# Patient Record
Sex: MALE | Race: WHITE
[De-identification: names, ages, dates, MRNs, and addresses within clinical notes are randomized per-mention and may not be internally consistent; named-entity substitution may affect disease eponyms.]

---

## 2021-03-27 ENCOUNTER — HOSPITAL ENCOUNTER (EMERGENCY)
Dept: HOSPITAL 8 - ED | Age: 51
Discharge: HOME | End: 2021-03-27
Payer: OTHER GOVERNMENT

## 2021-03-27 VITALS — DIASTOLIC BLOOD PRESSURE: 85 MMHG | SYSTOLIC BLOOD PRESSURE: 135 MMHG

## 2021-03-27 VITALS — WEIGHT: 236.78 LBS | BODY MASS INDEX: 30.39 KG/M2 | HEIGHT: 74 IN
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
## 2021-03-27 DIAGNOSIS — S52.021A: Primary | ICD-10-CM

## 2021-03-27 DIAGNOSIS — S62.354A: ICD-10-CM
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
## 2021-03-27 PROCEDURE — 99283 EMERGENCY DEPT VISIT LOW MDM: CPT
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
## 2021-03-30 ENCOUNTER — HOSPITAL ENCOUNTER (OUTPATIENT)
Facility: MEDICAL CENTER | Age: 51
End: 2021-03-30
Attending: ANESTHESIOLOGY
Payer: COMMERCIAL

## 2021-03-30 LAB
SARS-COV+SARS-COV-2 AG RESP QL IA.RAPID: NOTDETECTED
SPECIMEN SOURCE: NORMAL

## 2021-03-30 PROCEDURE — 87426 SARSCOV CORONAVIRUS AG IA: CPT

## 2023-06-30 ENCOUNTER — HOSPITAL ENCOUNTER (EMERGENCY)
Facility: MEDICAL CENTER | Age: 53
End: 2023-06-30
Attending: EMERGENCY MEDICINE
Payer: COMMERCIAL

## 2023-06-30 ENCOUNTER — APPOINTMENT (OUTPATIENT)
Dept: RADIOLOGY | Facility: MEDICAL CENTER | Age: 53
End: 2023-06-30
Attending: EMERGENCY MEDICINE
Payer: COMMERCIAL

## 2023-06-30 VITALS
SYSTOLIC BLOOD PRESSURE: 142 MMHG | OXYGEN SATURATION: 94 % | TEMPERATURE: 97.9 F | RESPIRATION RATE: 16 BRPM | BODY MASS INDEX: 28.1 KG/M2 | WEIGHT: 218.92 LBS | DIASTOLIC BLOOD PRESSURE: 88 MMHG | HEIGHT: 74 IN | HEART RATE: 70 BPM

## 2023-06-30 DIAGNOSIS — K14.0 CELLULITIS OF TONGUE: ICD-10-CM

## 2023-06-30 DIAGNOSIS — E87.1 HYPONATREMIA: ICD-10-CM

## 2023-06-30 DIAGNOSIS — S01.552A OPEN WOUND OF TONGUE DUE TO BITE: ICD-10-CM

## 2023-06-30 LAB
ALBUMIN SERPL BCP-MCNC: 4.4 G/DL (ref 3.2–4.9)
ALBUMIN/GLOB SERPL: 1.9 G/DL
ALP SERPL-CCNC: 37 U/L (ref 30–99)
ALT SERPL-CCNC: 16 U/L (ref 2–50)
ANION GAP SERPL CALC-SCNC: 11 MMOL/L (ref 7–16)
AST SERPL-CCNC: 15 U/L (ref 12–45)
BASOPHILS # BLD AUTO: 0.3 % (ref 0–1.8)
BASOPHILS # BLD: 0.04 K/UL (ref 0–0.12)
BILIRUB SERPL-MCNC: 0.4 MG/DL (ref 0.1–1.5)
BUN SERPL-MCNC: 6 MG/DL (ref 8–22)
CALCIUM ALBUM COR SERPL-MCNC: 8.6 MG/DL (ref 8.5–10.5)
CALCIUM SERPL-MCNC: 8.9 MG/DL (ref 8.5–10.5)
CHLORIDE SERPL-SCNC: 87 MMOL/L (ref 96–112)
CO2 SERPL-SCNC: 24 MMOL/L (ref 20–33)
CREAT SERPL-MCNC: 0.89 MG/DL (ref 0.5–1.4)
EOSINOPHIL # BLD AUTO: 0.05 K/UL (ref 0–0.51)
EOSINOPHIL NFR BLD: 0.4 % (ref 0–6.9)
ERYTHROCYTE [DISTWIDTH] IN BLOOD BY AUTOMATED COUNT: 39.2 FL (ref 35.9–50)
GFR SERPLBLD CREATININE-BSD FMLA CKD-EPI: 102 ML/MIN/1.73 M 2
GLOBULIN SER CALC-MCNC: 2.3 G/DL (ref 1.9–3.5)
GLUCOSE SERPL-MCNC: 88 MG/DL (ref 65–99)
HCT VFR BLD AUTO: 39.5 % (ref 42–52)
HGB BLD-MCNC: 14.1 G/DL (ref 14–18)
IMM GRANULOCYTES # BLD AUTO: 0.05 K/UL (ref 0–0.11)
IMM GRANULOCYTES NFR BLD AUTO: 0.4 % (ref 0–0.9)
LYMPHOCYTES # BLD AUTO: 3.58 K/UL (ref 1–4.8)
LYMPHOCYTES NFR BLD: 30.5 % (ref 22–41)
MCH RBC QN AUTO: 30.8 PG (ref 27–33)
MCHC RBC AUTO-ENTMCNC: 35.7 G/DL (ref 32.3–36.5)
MCV RBC AUTO: 86.2 FL (ref 81.4–97.8)
MONOCYTES # BLD AUTO: 0.95 K/UL (ref 0–0.85)
MONOCYTES NFR BLD AUTO: 8.1 % (ref 0–13.4)
NEUTROPHILS # BLD AUTO: 7.06 K/UL (ref 1.82–7.42)
NEUTROPHILS NFR BLD: 60.3 % (ref 44–72)
NRBC # BLD AUTO: 0 K/UL
NRBC BLD-RTO: 0 /100 WBC (ref 0–0.2)
PLATELET # BLD AUTO: 266 K/UL (ref 164–446)
PMV BLD AUTO: 8.7 FL (ref 9–12.9)
POTASSIUM SERPL-SCNC: 4.1 MMOL/L (ref 3.6–5.5)
PROT SERPL-MCNC: 6.7 G/DL (ref 6–8.2)
RBC # BLD AUTO: 4.58 M/UL (ref 4.7–6.1)
SODIUM SERPL-SCNC: 122 MMOL/L (ref 135–145)
VALPROATE SERPL-MCNC: 52.5 UG/ML (ref 50–100)
WBC # BLD AUTO: 11.7 K/UL (ref 4.8–10.8)

## 2023-06-30 PROCEDURE — 80164 ASSAY DIPROPYLACETIC ACD TOT: CPT

## 2023-06-30 PROCEDURE — 70491 CT SOFT TISSUE NECK W/DYE: CPT

## 2023-06-30 PROCEDURE — 96365 THER/PROPH/DIAG IV INF INIT: CPT | Mod: XU

## 2023-06-30 PROCEDURE — 99284 EMERGENCY DEPT VISIT MOD MDM: CPT

## 2023-06-30 PROCEDURE — 96375 TX/PRO/DX INJ NEW DRUG ADDON: CPT | Mod: XU

## 2023-06-30 PROCEDURE — 36415 COLL VENOUS BLD VENIPUNCTURE: CPT

## 2023-06-30 PROCEDURE — 85025 COMPLETE CBC W/AUTO DIFF WBC: CPT

## 2023-06-30 PROCEDURE — 80053 COMPREHEN METABOLIC PANEL: CPT

## 2023-06-30 PROCEDURE — 700105 HCHG RX REV CODE 258: Performed by: EMERGENCY MEDICINE

## 2023-06-30 PROCEDURE — 700111 HCHG RX REV CODE 636 W/ 250 OVERRIDE (IP): Performed by: EMERGENCY MEDICINE

## 2023-06-30 PROCEDURE — 700117 HCHG RX CONTRAST REV CODE 255: Performed by: EMERGENCY MEDICINE

## 2023-06-30 RX ORDER — DIVALPROEX SODIUM 500 MG/1
1000 TABLET, EXTENDED RELEASE ORAL 2 TIMES DAILY
COMMUNITY

## 2023-06-30 RX ORDER — OXYCODONE HYDROCHLORIDE 5 MG/1
5-10 TABLET ORAL EVERY 6 HOURS PRN
Qty: 16 TABLET | Refills: 0 | Status: SHIPPED | OUTPATIENT
Start: 2023-06-30 | End: 2023-07-05

## 2023-06-30 RX ORDER — IBUPROFEN 200 MG
1000 TABLET ORAL ONCE
COMMUNITY

## 2023-06-30 RX ORDER — DIPHENHYDRAMINE HYDROCHLORIDE AND LIDOCAINE HYDROCHLORIDE AND ALUMINUM HYDROXIDE AND MAGNESIUM HYDRO
5 KIT 4 TIMES DAILY
COMMUNITY
Start: 2023-06-21 | End: 2023-07-01

## 2023-06-30 RX ORDER — ONDANSETRON 2 MG/ML
4 INJECTION INTRAMUSCULAR; INTRAVENOUS ONCE
Status: COMPLETED | OUTPATIENT
Start: 2023-06-30 | End: 2023-06-30

## 2023-06-30 RX ORDER — DEXAMETHASONE SODIUM PHOSPHATE 4 MG/ML
10 INJECTION, SOLUTION INTRA-ARTICULAR; INTRALESIONAL; INTRAMUSCULAR; INTRAVENOUS; SOFT TISSUE ONCE
Status: COMPLETED | OUTPATIENT
Start: 2023-06-30 | End: 2023-06-30

## 2023-06-30 RX ORDER — MORPHINE SULFATE 4 MG/ML
4 INJECTION INTRAVENOUS ONCE
Status: COMPLETED | OUTPATIENT
Start: 2023-06-30 | End: 2023-06-30

## 2023-06-30 RX ORDER — AMOXICILLIN AND CLAVULANATE POTASSIUM 875; 125 MG/1; MG/1
1 TABLET, FILM COATED ORAL 2 TIMES DAILY
Qty: 14 TABLET | Refills: 0 | Status: ACTIVE | OUTPATIENT
Start: 2023-06-30 | End: 2023-07-07

## 2023-06-30 RX ORDER — ACETAMINOPHEN 500 MG
1000 TABLET ORAL ONCE
COMMUNITY

## 2023-06-30 RX ADMIN — ONDANSETRON 4 MG: 2 INJECTION INTRAMUSCULAR; INTRAVENOUS at 14:44

## 2023-06-30 RX ADMIN — IOHEXOL 80 ML: 350 INJECTION, SOLUTION INTRAVENOUS at 16:35

## 2023-06-30 RX ADMIN — DEXAMETHASONE SODIUM PHOSPHATE 10 MG: 4 INJECTION, SOLUTION INTRAMUSCULAR; INTRAVENOUS at 14:46

## 2023-06-30 RX ADMIN — MORPHINE SULFATE 4 MG: 4 INJECTION INTRAVENOUS at 14:45

## 2023-06-30 RX ADMIN — AMPICILLIN AND SULBACTAM 3 G: 1; 2 INJECTION, POWDER, FOR SOLUTION INTRAMUSCULAR; INTRAVENOUS at 14:47

## 2023-06-30 ASSESSMENT — FIBROSIS 4 INDEX: FIB4 SCORE: 0.67

## 2023-06-30 NOTE — ED NOTES
Pt ambulated from lobby to [place] [facility] without assistance, tolerated well. Pt placed in hospital gown and is now sitting up in bed talking to wife at bedside with even and unlabored breaths, in no apparent distress at this time. Will continue to monitor pt while awaiting orders.

## 2023-06-30 NOTE — ED PROVIDER NOTES
"ED Provider Note    CHIEF COMPLAINT  Chief Complaint   Patient presents with    Tongue Swelling     States hx of epilepsy, pt reports biting his tongue x3 over the last 2 weeks and with increasing swelling over the last week. Pt able to manage secretions and airway. +subjective fevers       EXTERNAL RECORDS REVIEWED  Outpatient Notes primary care office visit June 2021 for right elbow injury    HPI/ROS  LIMITATION TO HISTORY   Select: : None  OUTSIDE HISTORIAN(S):  Significant other at bedside for discussion of history and symptoms    Dmitry Mason is a 52 y.o. male who presents with tongue swelling, difficulty swallowing, no shortness of breath.  Patient last had a seizure 10 days ago.  That was his third this month.  10 days ago his dosage of valproic acid was increased.  Patient states he normally had 1 seizure a year, and last month had increased frequency of seizures prompting change in his medicine then increase of dose.  No acute numbness weakness to extremities.  He denies new headache.  During his 3 grand mal seizures this month bit his tongue repetitively, while healing, it started to swell.  The patient felt feverish today.  No rash.    PAST MEDICAL HISTORY   has a past medical history of Head ache, Hypertension, and Seizure (HCC).    SURGICAL HISTORY   has a past surgical history that includes hernia repair and orif, elbow (Right).    FAMILY HISTORY  History reviewed. No pertinent family history.    SOCIAL HISTORY  Social History     Tobacco Use    Smoking status: Never    Smokeless tobacco: Never   Vaping Use    Vaping Use: Never used   Substance and Sexual Activity    Alcohol use: Not on file    Drug use: Yes     Comment: microdoses \"magic mushrooms\" occasionally for seizures per pt    Sexual activity: Not on file       CURRENT MEDICATIONS  Home Medications       Reviewed by Magdi Desai R.N. (Registered Nurse) on 06/30/23 at 1257  Med List Status: Not Addressed     Medication Last Dose Status " "  albuterol 108 (90 Base) MCG/ACT Aero Soln inhalation aerosol  Active   benzonatate (TESSALON) 100 MG Cap  Active   divalproex (DEPAKOTE) 250 MG Tablet Delayed Response  Active   lisinopril (PRINIVIL) 10 MG Tab  Active   topiramate (TOPAMAX) 100 MG Tab  Active                    ALLERGIES  No Known Allergies    PHYSICAL EXAM  VITAL SIGNS: BP (!) 157/87   Pulse 68   Temp 36.6 °C (97.9 °F) (Temporal)   Resp 16   Ht 1.88 m (6' 2\")   Wt 99.3 kg (218 lb 14.7 oz)   SpO2 95%   BMI 28.11 kg/m²    Neurologic: Sensation and strength normal.  Speech clear  GI: Abdomen soft and nontender  Muscle skeletal: Neck nontender  Respiratory: Clear lung sounds, no stridor  Eyes: No scleral icterus  ENT: 2 large tongue bite wounds to the right tongue, partially healed.  No hemorrhage.  Tongue itself is generally mildly swollen.  The posterior pharynx normal    DIAGNOSTIC STUDIES / PROCEDURES      LABS  Results for orders placed or performed during the hospital encounter of 06/30/23   CBC WITH DIFFERENTIAL   Result Value Ref Range    WBC 11.7 (H) 4.8 - 10.8 K/uL    RBC 4.58 (L) 4.70 - 6.10 M/uL    Hemoglobin 14.1 14.0 - 18.0 g/dL    Hematocrit 39.5 (L) 42.0 - 52.0 %    MCV 86.2 81.4 - 97.8 fL    MCH 30.8 27.0 - 33.0 pg    MCHC 35.7 32.3 - 36.5 g/dL    RDW 39.2 35.9 - 50.0 fL    Platelet Count 266 164 - 446 K/uL    MPV 8.7 (L) 9.0 - 12.9 fL    Neutrophils-Polys 60.30 44.00 - 72.00 %    Lymphocytes 30.50 22.00 - 41.00 %    Monocytes 8.10 0.00 - 13.40 %    Eosinophils 0.40 0.00 - 6.90 %    Basophils 0.30 0.00 - 1.80 %    Immature Granulocytes 0.40 0.00 - 0.90 %    Nucleated RBC 0.00 0.00 - 0.20 /100 WBC    Neutrophils (Absolute) 7.06 1.82 - 7.42 K/uL    Lymphs (Absolute) 3.58 1.00 - 4.80 K/uL    Monos (Absolute) 0.95 (H) 0.00 - 0.85 K/uL    Eos (Absolute) 0.05 0.00 - 0.51 K/uL    Baso (Absolute) 0.04 0.00 - 0.12 K/uL    Immature Granulocytes (abs) 0.05 0.00 - 0.11 K/uL    NRBC (Absolute) 0.00 K/uL   COMP METABOLIC PANEL   Result " Value Ref Range    Sodium 122 (L) 135 - 145 mmol/L    Potassium 4.1 3.6 - 5.5 mmol/L    Chloride 87 (L) 96 - 112 mmol/L    Co2 24 20 - 33 mmol/L    Anion Gap 11.0 7.0 - 16.0    Glucose 88 65 - 99 mg/dL    Bun 6 (L) 8 - 22 mg/dL    Creatinine 0.89 0.50 - 1.40 mg/dL    Calcium 8.9 8.5 - 10.5 mg/dL    AST(SGOT) 15 12 - 45 U/L    ALT(SGPT) 16 2 - 50 U/L    Alkaline Phosphatase 37 30 - 99 U/L    Total Bilirubin 0.4 0.1 - 1.5 mg/dL    Albumin 4.4 3.2 - 4.9 g/dL    Total Protein 6.7 6.0 - 8.2 g/dL    Globulin 2.3 1.9 - 3.5 g/dL    A-G Ratio 1.9 g/dL   VALPROIC ACID   Result Value Ref Range    Valproic Acid 52.5 50.0 - 100.0 ug/mL   ESTIMATED GFR   Result Value Ref Range    GFR (CKD-EPI) 102 >60 mL/min/1.73 m 2   CORRECTED CALCIUM   Result Value Ref Range    Correct Calcium 8.6 8.5 - 10.5 mg/dL         RADIOLOGY  I have independently interpreted the diagnostic imaging associated with this visit and am waiting the final reading from the radiologist.   My preliminary interpretation is as follows: CT scan of the neck with contrast shows no abscess  Radiologist interpretation:   CT-SOFT TISSUE NECK WITH   Final Result      1.  No evidence of an acute abnormality of the neck.   2.  Suboptimal evaluation of the face adjacent to multiple dental amalgams related to streak artifact.            COURSE & MEDICAL DECISION MAKING    ED Observation Status? Yes; I am placing the patient in to an observation status due to a diagnostic uncertainty as well as therapeutic intensity. Patient placed in observation status at 2:21 PM, 6/30/2023.     Observation plan is as follows: Serial exam, therapeutics with reassessment, radiographic imaging for evaluation of tongue swelling    Upon Reevaluation, the patient's condition has: Improved; and will be discharged.    Patient discharged from ED Observation status at 6 PM (Time) June 30, 2023 (Date).     INITIAL ASSESSMENT, COURSE AND PLAN  Care Narrative: Patient presents with tongue swelling  secondary to repetitive trauma from seizures earlier this month.  His valproic acid level was checked, therapeutic at 52.  He has also not experienced any further seizures over the last 10 days, I recommended he continue his current medication dose.  CT scan of the neck was obtained secondary to complaints of difficulty swallowing and swelling, there is no evidence of hematoma or swelling or abscess.  Case was discussed with on-call ear nose throat Dr. Rosenberg.  She has recommended antibiotics.  Patient was treated with IV Decadron in the ER with improvement of symptoms.  He was given a dose of morphine for pain control and started on Unasyn.  He will continue with Augmentin.  Oxycodone has been prescribed for pain control.  Patient already is using Tylenol and Advil as needed.  Patient is tolerating oral intake.  Incidental finding of hyponatremia, this is likely secondary to the patient drinking large volumes of water daily.  I have discussed moderating his water intake, drinking electrolyte solutions, and has been given instructions on hyponatremia.  The patient is well-appearing upon discharge        ADDITIONAL PROBLEM LIST  Hyponatremia: Likely secondary to overusage of water, counseled    Tongue wounds: Secondary to seizures with bite wounds, possibility of new infection or tongue cellulitis will be treated with antibiotics    History of seizures: Continue on valproic acid, therapeutic level today    DISPOSITION AND DISCUSSIONS  I have discussed management of the patient with the following physicians and RADHA's: On-call ear nose throat specialist      Escalation of care considered, and ultimately not performed:acute inpatient care management, however at this time, the patient is most appropriate for outpatient management        FINAL DIAGNOSIS  1. Cellulitis of tongue    2. Open wound of tongue due to bite    3. Hyponatremia           Electronically signed by: Demetrius Osborn M.D., 6/30/2023 2:20 PM

## 2023-06-30 NOTE — ED NOTES
Pt ambulatory to bathroom and back to room without assistance, tolerated well. Pt's wife still at bedside.

## 2023-06-30 NOTE — ED TRIAGE NOTES
"Chief Complaint   Patient presents with    Tongue Swelling     States hx of epilepsy, pt reports biting his tongue x3 over the last 2 weeks and with increasing swelling over the last week. Pt able to manage secretions and airway. +subjective fevers     Pt ambulatory to triage for above complaint. [facility]     Pt A&Ox4, GCS15, NAD. Pt placed back in ER lobby and encouraged to alert staff of any changes    BP (!) 139/90   Pulse 74   Temp 36.6 °C (97.9 °F) (Temporal)   Resp 17   Ht 1.88 m (6' 2\")   Wt 99.3 kg (218 lb 14.7 oz)   SpO2 96%   BMI 28.11 kg/m²     "

## 2023-07-01 NOTE — ED NOTES
Med Rec complete per patient, family, and [facility]   Allergies reviewed  No oral antibiotics in the last 30 days  [facility]    Per [facility] and patient last 4 of SSN is [id number]. Updated PAR to incorrect SSN in chart.

## 2023-07-01 NOTE — DISCHARGE INSTRUCTIONS
Return for difficulty swallowing, difficulty breathing, or any concerns.  Please avoid over drinking water, consider other liquids such as Gatorade, soup, nutrition shakes